# Patient Record
Sex: FEMALE | Race: WHITE | NOT HISPANIC OR LATINO | URBAN - METROPOLITAN AREA
[De-identification: names, ages, dates, MRNs, and addresses within clinical notes are randomized per-mention and may not be internally consistent; named-entity substitution may affect disease eponyms.]

---

## 2017-07-28 ENCOUNTER — EMERGENCY (EMERGENCY)
Facility: HOSPITAL | Age: 52
LOS: 0 days | Discharge: HOME | End: 2017-07-28
Attending: EMERGENCY MEDICINE
Payer: COMMERCIAL

## 2017-07-28 VITALS
OXYGEN SATURATION: 97 % | HEIGHT: 62 IN | SYSTOLIC BLOOD PRESSURE: 148 MMHG | RESPIRATION RATE: 20 BRPM | HEART RATE: 92 BPM | TEMPERATURE: 98 F | WEIGHT: 149.91 LBS | DIASTOLIC BLOOD PRESSURE: 106 MMHG

## 2017-07-28 VITALS
RESPIRATION RATE: 19 BRPM | TEMPERATURE: 98 F | DIASTOLIC BLOOD PRESSURE: 103 MMHG | HEART RATE: 82 BPM | SYSTOLIC BLOOD PRESSURE: 182 MMHG | OXYGEN SATURATION: 100 %

## 2017-07-28 DIAGNOSIS — Z88.8 ALLERGY STATUS TO OTHER DRUGS, MEDICAMENTS AND BIOLOGICAL SUBSTANCES STATUS: ICD-10-CM

## 2017-07-28 DIAGNOSIS — F41.9 ANXIETY DISORDER, UNSPECIFIED: ICD-10-CM

## 2017-07-28 DIAGNOSIS — M25.551 PAIN IN RIGHT HIP: ICD-10-CM

## 2017-07-28 DIAGNOSIS — I10 ESSENTIAL (PRIMARY) HYPERTENSION: ICD-10-CM

## 2017-07-28 DIAGNOSIS — M54.5 LOW BACK PAIN: ICD-10-CM

## 2017-07-28 DIAGNOSIS — F32.9 MAJOR DEPRESSIVE DISORDER, SINGLE EPISODE, UNSPECIFIED: ICD-10-CM

## 2017-07-28 DIAGNOSIS — G89.29 OTHER CHRONIC PAIN: ICD-10-CM

## 2017-07-28 DIAGNOSIS — K21.9 GASTRO-ESOPHAGEAL REFLUX DISEASE WITHOUT ESOPHAGITIS: ICD-10-CM

## 2017-07-28 PROCEDURE — 99283 EMERGENCY DEPT VISIT LOW MDM: CPT | Mod: 25

## 2017-07-28 RX ORDER — OXYCODONE AND ACETAMINOPHEN 5; 325 MG/1; MG/1
1 TABLET ORAL ONCE
Qty: 0 | Refills: 0 | Status: DISCONTINUED | OUTPATIENT
Start: 2017-07-28 | End: 2017-07-28

## 2017-07-28 RX ADMIN — OXYCODONE AND ACETAMINOPHEN 1 TABLET(S): 5; 325 TABLET ORAL at 04:50

## 2017-07-28 NOTE — ED ADULT NURSE NOTE - PMH
Anxiety    Depression    Gastroesophageal reflux disease    Hypertension  hx intermittent elevation in bp,  last prescribed meds 2 yrs  Leiomyoma uteri    Seasonal allergies

## 2017-07-28 NOTE — ED PROVIDER NOTE - MEDICAL DECISION MAKING DETAILS
Ddx: MSK back pain/ no symptoms concerning for cauda equina  Plan: pain control, referral to ortho/ neurosurg for MRI and potential surgical treatment. / continue PT

## 2017-07-28 NOTE — ED ADULT NURSE NOTE - CHPI ED SYMPTOMS NEG
no difficulty bearing weight/no motor function loss/no constipation/no neck tenderness/no fatigue/no bowel dysfunction/no bladder dysfunction/no anorexia

## 2017-07-28 NOTE — ED PROVIDER NOTE - OBJECTIVE STATEMENT
Pt is a 53 yo lady with a pmhx of HTN, HL, fibromyalgia, chronic back pain who presents to the ED with back pain. It has been going on for several months, has seen PMD, and started physical therapy this week. Feels like this exacerbated her back pain. No numbness, tingling, or weakness, no bowel or bladder incontinence, no saddle anesthesia. No trauma, no fall.

## 2017-08-15 ENCOUNTER — APPOINTMENT (OUTPATIENT)
Dept: NEUROSURGERY | Facility: CLINIC | Age: 52
End: 2017-08-15

## 2018-01-03 ENCOUNTER — APPOINTMENT (OUTPATIENT)
Dept: PAIN MANAGEMENT | Facility: CLINIC | Age: 53
End: 2018-01-03

## 2018-06-27 ENCOUNTER — EMERGENCY (EMERGENCY)
Facility: HOSPITAL | Age: 53
LOS: 0 days | Discharge: LEFT BEFORE DISCHARGE | End: 2018-06-27

## 2018-06-27 VITALS
TEMPERATURE: 98 F | HEART RATE: 87 BPM | RESPIRATION RATE: 17 BRPM | HEIGHT: 62 IN | DIASTOLIC BLOOD PRESSURE: 94 MMHG | SYSTOLIC BLOOD PRESSURE: 156 MMHG | WEIGHT: 149.91 LBS | OXYGEN SATURATION: 100 %

## 2018-06-27 DIAGNOSIS — R51 HEADACHE: ICD-10-CM

## 2018-06-27 DIAGNOSIS — R52 PAIN, UNSPECIFIED: ICD-10-CM

## 2018-06-27 NOTE — ED ADULT TRIAGE NOTE - CALLED TO TRIAGE FIRST TIME
pt left demanding being seeing right away, was told by me stredavion was being clean for her to be place

## 2021-06-17 NOTE — ED ADULT NURSE NOTE - NEURO WDL
none Alert and oriented to person, place and time, memory intact, behavior appropriate to situation, PERRL.

## 2023-04-28 ENCOUNTER — APPOINTMENT (OUTPATIENT)
Dept: PAIN MANAGEMENT | Facility: CLINIC | Age: 58
End: 2023-04-28
Payer: MEDICARE

## 2023-04-28 VITALS — WEIGHT: 130 LBS | HEIGHT: 62 IN | BODY MASS INDEX: 23.92 KG/M2

## 2023-04-28 PROCEDURE — 99204 OFFICE O/P NEW MOD 45 MIN: CPT

## 2023-04-28 NOTE — PHYSICAL EXAM
Adequate: hears normal conversation without difficulty [de-identified] : Constitutional; Appears well, no apparent distress\par Ability to communicate: Normal \par Respiratory: non-labored breathing\par Skin: No rash noted\par Head: Normocephalic, atraumatic\par Neck: no visible thyroid enlargement\par Eyes: Extraocular movements intact\par Neurologic: Alert and oriented x3\par Psychiatric: normal mood, affect and behavior\par  [] : positive [place]

## 2023-04-28 NOTE — DISCUSSION/SUMMARY
[de-identified] : After discussing various treatment options with the patient including but not limited to oral medications, physical therapy, exercise modalities as well as interventional spinal injections, we have decided with the following plan:\par \par - Continue Home exercises, stretching, activity modification, physical therapy, and conservative care.\par - MRI report and/or images was reviewed and discussed with the patient.\par - Recommend C7-T1 Cervical Epidural Steroid Injection under fluoroscopic guidance with image.\par - The risks, benefits and alternatives of the proposed procedure were explained in detail with the patient. The risks outlined include but are not limited to infection, bleeding, post-dural puncture headache, nerve injury, a temporary increase in pain, failure to resolve symptoms, allergic reaction, symptom recurrence, and possible elevation of blood sugar in diabetics. All questions were answered to patient's apparent satisfaction and he/she verbalized an understanding.\par - Patient is presenting with acute/sub-acute radicular pain with impairment in ADLs and functionality.  The pain has not responded to conservative care including NSAID therapy and/or physical therapy.  There is no bleeding tendency, unstable medical condition, or systemic infection.\par - Follow up in 1-2 weeks post injection for re-evaluation.\par

## 2023-04-28 NOTE — HISTORY OF PRESENT ILLNESS
[Neck] : neck [Mid-back] : mid-back [Lower back] : lower back [8] : 8 [4] : 4 [Sharp] : sharp [Constant] : constant [Household chores] : household chores [Rest] : rest [Meds] : meds [Heat] : heat [Standing] : standing [Walking] : walking [FreeTextEntry1] : Initial HPI 04/28/23:  Pt of Dr. Vu but wants to stay in [place]\par Pain started many years ago and is on the right side of the neck and radiates to the right shoulder and down the right arm to the fingers described as a sharp shooting pain with associated numbness and tingling.\par \par MRI Cervical Spine 1/18/23 independently reviewed: multilevel disc bulges; C3-4 severe right NF stenosis; C5-6 severe left NF stenosis \par Conservative Care:  Has tried PT but was too painful \par Pain Medications: tylenol, gabapentin 300mg TID (from PCP)\par Past Injections: CESIs with Dr. Vu \par Spine surgery: none \par Blood thinners: none\par  [] : no [FreeTextEntry6] : numbness  [FreeTextEntry7] : head , ears , shoulders  [FreeTextEntry9] : gabapentin  [de-identified] : c mri

## 2023-05-22 ENCOUNTER — APPOINTMENT (OUTPATIENT)
Age: 58
End: 2023-05-22
Payer: MEDICARE

## 2023-05-22 PROCEDURE — 62321 NJX INTERLAMINAR CRV/THRC: CPT

## 2023-06-09 ENCOUNTER — APPOINTMENT (OUTPATIENT)
Dept: PAIN MANAGEMENT | Facility: CLINIC | Age: 58
End: 2023-06-09
Payer: MEDICARE

## 2023-06-09 VITALS — HEIGHT: 62 IN | BODY MASS INDEX: 23.92 KG/M2 | WEIGHT: 130 LBS

## 2023-06-09 PROCEDURE — 99213 OFFICE O/P EST LOW 20 MIN: CPT

## 2023-06-09 NOTE — PHYSICAL EXAM
[de-identified] : Constitutional; Appears well, no apparent distress\par Ability to communicate: Normal \par Respiratory: non-labored breathing\par Skin: No rash noted\par Head: Normocephalic, atraumatic\par Neck: no visible thyroid enlargement\par Eyes: Extraocular movements intact\par Neurologic: Alert and oriented x3\par Psychiatric: normal mood, affect and behavior\par  [] : negative [place]

## 2023-06-09 NOTE — HISTORY OF PRESENT ILLNESS
[Neck] : neck [Mid-back] : mid-back [Lower back] : lower back [8] : 8 [4] : 4 [Sharp] : sharp [Household chores] : household chores [Rest] : rest [Meds] : meds [Heat] : heat [Standing] : standing [Walking] : walking [Intermittent] : intermittent [FreeTextEntry1] : 06/09/2023: s/p C7-T1 ALICE on 05/22/23 with 100% relief and improvement of ADLs. Has been feeling great since the injection. \par \par Initial HPI 04/28/23:  Pt of Dr. Vu but wants to stay in [place]\par Pain started many years ago and is on the right side of the neck and radiates to the right shoulder and down the right arm to the fingers described as a sharp shooting pain with associated numbness and tingling.\par \par MRI Cervical Spine 1/18/23 independently reviewed: multilevel disc bulges; C3-4 severe right NF stenosis; C5-6 severe left NF stenosis \par Conservative Care:  Has tried PT but was too painful \par Pain Medications: tylenol, gabapentin 300mg TID (from PCP)\par Past Injections: CESIs with Dr. Vu \par Spine surgery: none \par Blood thinners: none\par  [] : no [FreeTextEntry6] : numbness  [FreeTextEntry7] : head , ears , shoulders  [FreeTextEntry9] : gabapentin  [de-identified] : c mri  [TWNoteComboBox1] : 80%

## 2023-06-09 NOTE — DISCUSSION/SUMMARY
[de-identified] : After discussing various treatment options with the patient including but not limited to oral medications, physical therapy, exercise modalities as well as interventional spinal injections, we have decided with the following plan:\par \par - Continue Home exercises, stretching, activity modification, physical therapy, and conservative care.\par - MRI report and/or images was reviewed and discussed with the patient.\par - Recommend C7-T1 Cervical Epidural Steroid Injection under fluoroscopic guidance with image.\par - The risks, benefits and alternatives of the proposed procedure were explained in detail with the patient. The risks outlined include but are not limited to infection, bleeding, post-dural puncture headache, nerve injury, a temporary increase in pain, failure to resolve symptoms, allergic reaction, symptom recurrence, and possible elevation of blood sugar in diabetics. All questions were answered to patient's apparent satisfaction and he/she verbalized an understanding.\par - Patient is presenting with acute/sub-acute radicular pain with impairment in ADLs and functionality.  The pain has not responded to conservative care including NSAID therapy and/or physical therapy.  There is no bleeding tendency, unstable medical condition, or systemic infection.\par - Follow up in 1-2 weeks post injection for re-evaluation.\par - Will call to schedule if pain returns. \par \par

## 2023-09-13 ENCOUNTER — APPOINTMENT (OUTPATIENT)
Age: 58
End: 2023-09-13
Payer: MEDICARE

## 2023-09-13 PROCEDURE — 62321 NJX INTERLAMINAR CRV/THRC: CPT

## 2023-09-29 ENCOUNTER — APPOINTMENT (OUTPATIENT)
Dept: PAIN MANAGEMENT | Facility: CLINIC | Age: 58
End: 2023-09-29

## 2024-02-02 ENCOUNTER — APPOINTMENT (OUTPATIENT)
Dept: PAIN MANAGEMENT | Facility: CLINIC | Age: 59
End: 2024-02-02
Payer: MEDICARE

## 2024-02-02 VITALS — WEIGHT: 130 LBS | BODY MASS INDEX: 23.92 KG/M2 | HEIGHT: 62 IN

## 2024-02-02 PROCEDURE — 99214 OFFICE O/P EST MOD 30 MIN: CPT

## 2024-02-02 NOTE — HISTORY OF PRESENT ILLNESS
[Neck] : neck [Mid-back] : mid-back [Lower back] : lower back [8] : 8 [4] : 4 [Sharp] : sharp [Intermittent] : intermittent [Household chores] : household chores [Rest] : rest [Meds] : meds [Heat] : heat [Standing] : standing [Walking] : walking [FreeTextEntry1] : 02/02/2024: s/p C7-T1 ALICE on 09/13/23 with >70% relief and improvement. Pain has recently started to return and radiates down the left arm to the fingers.   MRI C-spine 1/18/23 independently reviewed: moderate/severe C3-4, C4-5, C5-6 NF stenosis   06/09/2023: s/p C7-T1 ALICE on 05/22/23 with 100% relief and improvement of ADLs. Has been feeling great since the injection.   Initial HPI 04/28/23:  Pt of Dr. Vu but wants to stay in [place] Pain started many years ago and is on the right side of the neck and radiates to the right shoulder and down the right arm to the fingers described as a sharp shooting pain with associated numbness and tingling.  MRI Cervical Spine 1/18/23 independently reviewed: multilevel disc bulges; C3-4 severe right NF stenosis; C5-6 severe left NF stenosis  Conservative Care:  Has tried PT but was too painful  Pain Medications: tylenol, gabapentin 300mg TID (from PCP) Past Injections: CESIs with Dr. Vu  Spine surgery: none  Blood thinners: none  [] : no [FreeTextEntry6] : numbness  [FreeTextEntry7] : head , ears , shoulders  [FreeTextEntry9] : gabapentin  [de-identified] : c mri  [TWNoteComboBox1] : 80%

## 2024-02-02 NOTE — DISCUSSION/SUMMARY
[de-identified] : After discussing various treatment options with the patient including but not limited to oral medications, physical therapy, exercise modalities as well as interventional spinal injections, we have decided with the following plan:  - Continue Home exercises, stretching, activity modification, physical therapy, and conservative care. - MRI report and/or images was reviewed and discussed with the patient. - Recommend C7-T1 Cervical Epidural Steroid Injection under fluoroscopic guidance with image. - The risks, benefits and alternatives of the proposed procedure were explained in detail with the patient. The risks outlined include but are not limited to infection, bleeding, post-dural puncture headache, nerve injury, a temporary increase in pain, failure to resolve symptoms, allergic reaction, symptom recurrence, and possible elevation of blood sugar in diabetics. All questions were answered to patient's apparent satisfaction and he/she verbalized an understanding. - Patient is presenting with acute/sub-acute radicular pain with impairment in ADLs and functionality.  The pain has not responded to conservative care including NSAID therapy and/or physical therapy.  There is no bleeding tendency, unstable medical condition, or systemic infection. - Follow up in 1-2 weeks post injection for re-evaluation. - Will provide prescription for Physical Therapy.

## 2024-02-02 NOTE — PHYSICAL EXAM
[de-identified] : Constitutional; Appears well, no apparent distress\par  Ability to communicate: Normal \par  Respiratory: non-labored breathing\par  Skin: No rash noted\par  Head: Normocephalic, atraumatic\par  Neck: no visible thyroid enlargement\par  Eyes: Extraocular movements intact\par  Neurologic: Alert and oriented x3\par  Psychiatric: normal mood, affect and behavior\par   [] : negative [place]

## 2024-03-06 ENCOUNTER — APPOINTMENT (OUTPATIENT)
Age: 59
End: 2024-03-06
Payer: MEDICARE

## 2024-03-06 PROCEDURE — 62321 NJX INTERLAMINAR CRV/THRC: CPT

## 2024-03-22 ENCOUNTER — APPOINTMENT (OUTPATIENT)
Dept: PAIN MANAGEMENT | Facility: CLINIC | Age: 59
End: 2024-03-22
Payer: MEDICARE

## 2024-03-22 VITALS — HEIGHT: 62 IN | BODY MASS INDEX: 23.92 KG/M2 | WEIGHT: 130 LBS

## 2024-03-22 DIAGNOSIS — M54.12 RADICULOPATHY, CERVICAL REGION: ICD-10-CM

## 2024-03-22 DIAGNOSIS — M54.2 CERVICALGIA: ICD-10-CM

## 2024-03-22 PROCEDURE — 99214 OFFICE O/P EST MOD 30 MIN: CPT

## 2024-03-22 RX ORDER — NAPROXEN 500 MG/1
500 TABLET ORAL
Qty: 30 | Refills: 0 | Status: ACTIVE | COMMUNITY
Start: 2024-03-22 | End: 1900-01-01

## 2024-03-22 NOTE — HISTORY OF PRESENT ILLNESS
[Mid-back] : mid-back [Neck] : neck [Lower back] : lower back [8] : 8 [4] : 4 [Sharp] : sharp [Intermittent] : intermittent [Household chores] : household chores [Rest] : rest [Heat] : heat [Meds] : meds [Standing] : standing [Walking] : walking [FreeTextEntry1] : 03/22/2024: s/p C7-T1 ALICE on 03/06/24 with >70% relief and improvement of ADLs. Able to move her head now without pain and little radiation down the arm.   02/02/2024: s/p C7-T1 ALICE on 09/13/23 with >70% relief and improvement. Pain has recently started to return and radiates down the left arm to the fingers.   MRI C-spine 1/18/23 independently reviewed: moderate/severe C3-4, C4-5, C5-6 NF stenosis   06/09/2023: s/p C7-T1 ALICE on 05/22/23 with 100% relief and improvement of ADLs. Has been feeling great since the injection.   Initial HPI 04/28/23:  Pt of Dr. Vu but wants to stay in [place] Pain started many years ago and is on the right side of the neck and radiates to the right shoulder and down the right arm to the fingers described as a sharp shooting pain with associated numbness and tingling.  MRI Cervical Spine 1/18/23 independently reviewed: multilevel disc bulges; C3-4 severe right NF stenosis; C5-6 severe left NF stenosis  Conservative Care:  Has tried PT but was too painful  Pain Medications: tylenol, gabapentin 300mg TID (from PCP) Past Injections: CESIs with Dr. Vu  Spine surgery: none  Blood thinners: none  [] : no [FreeTextEntry6] : numbness  [FreeTextEntry7] : head , ears , shoulders  [de-identified] : c mri  [FreeTextEntry9] : gabapentin  [TWNoteComboBox1] : 50%

## 2024-03-22 NOTE — DISCUSSION/SUMMARY
[de-identified] : After discussing various treatment options with the patient including but not limited to oral medications, physical therapy, exercise modalities as well as interventional spinal injections, we have decided with the following plan:  - Continue Home exercises, stretching, activity modification, physical therapy, and conservative care. - MRI report and/or images was reviewed and discussed with the patient. - Recommend C7-T1 Cervical Epidural Steroid Injection under fluoroscopic guidance with image. - The risks, benefits and alternatives of the proposed procedure were explained in detail with the patient. The risks outlined include but are not limited to infection, bleeding, post-dural puncture headache, nerve injury, a temporary increase in pain, failure to resolve symptoms, allergic reaction, symptom recurrence, and possible elevation of blood sugar in diabetics. All questions were answered to patient's apparent satisfaction and he/she verbalized an understanding. - Patient is presenting with acute/sub-acute radicular pain with impairment in ADLs and functionality.  The pain has not responded to conservative care including NSAID therapy and/or physical therapy.  There is no bleeding tendency, unstable medical condition, or systemic infection. - Follow up in 1-2 weeks post injection for re-evaluation.  - Recommend Naproxen 500mg BID PRN. Potential adverse effects including but not limited to bleeding, ulcers, increased risk of hypertension, heart disease, kidney disease and stroke were discussed with the patient.  Medication would allow patient to be more mobile and perform ADL's.  Will continue to monitor patient and attempt to wean as soon as possible. Will use the lowest dosage possible for the shortest possible period of time.

## 2024-03-22 NOTE — PHYSICAL EXAM
[de-identified] : Constitutional; Appears well, no apparent distress\par  Ability to communicate: Normal \par  Respiratory: non-labored breathing\par  Skin: No rash noted\par  Head: Normocephalic, atraumatic\par  Neck: no visible thyroid enlargement\par  Eyes: Extraocular movements intact\par  Neurologic: Alert and oriented x3\par  Psychiatric: normal mood, affect and behavior\par   [] : negative [place]